# Patient Record
Sex: MALE
[De-identification: names, ages, dates, MRNs, and addresses within clinical notes are randomized per-mention and may not be internally consistent; named-entity substitution may affect disease eponyms.]

---

## 2019-10-30 PROBLEM — Z00.00 ENCOUNTER FOR PREVENTIVE HEALTH EXAMINATION: Status: ACTIVE | Noted: 2019-10-30

## 2019-11-12 ENCOUNTER — APPOINTMENT (OUTPATIENT)
Dept: ORTHOPEDIC SURGERY | Facility: CLINIC | Age: 54
End: 2019-11-12
Payer: COMMERCIAL

## 2019-11-12 VITALS
SYSTOLIC BLOOD PRESSURE: 140 MMHG | OXYGEN SATURATION: 98 % | HEART RATE: 77 BPM | BODY MASS INDEX: 27.59 KG/M2 | DIASTOLIC BLOOD PRESSURE: 90 MMHG | HEIGHT: 74 IN | WEIGHT: 215 LBS

## 2019-11-12 PROCEDURE — 99203 OFFICE O/P NEW LOW 30 MIN: CPT

## 2019-11-13 NOTE — DISCUSSION/SUMMARY
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 He will avoid incline walking on the treadmill and see how his knee responds. We will order an HA injection for him. He will return when the injection arrives. All questions were answered. he will call if any issues arise.

## 2019-11-13 NOTE — HISTORY OF PRESENT ILLNESS
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 He has developed progressive right knee pain, stiffness and difficulty exercising. He developed progressive medial knee pain with running. He has switched to walking on an inclined treadmill with less discomfort. He has had intermittent swelling. he has clicking. He denies any locking or buckling. He has some discomfort on stairs and some limitations in his ADLs. He denies any previous knee issues. He has an MRI>

## 2019-11-13 NOTE — PHYSICAL EXAM
[de-identified] : MRI of the right knee shows moderate medial compartment chondral loss [de-identified] : The patient is a well developed, well nourished male in no apparent distress. He is alert and oriented X 3 with a pleasant mood and appropriate affect. \par \par On physical examination of the right knee, his ROM is 0-125 degrees. The patient walks with a normal gait and stands in neutral alignment. There is trace effusion. No warmth or erythema is noted. The patella is non tender to palpation medially or laterally. There is no crepitus noted. The apprehension and grind tests are negative. The extensor mechanism is intact. There is medial joint line tenderness. The Vidya sign is absent. The Lachman and pivot shift tests are negative. There is no varus or valgus laxity at 0 or 30 degrees. No posterolateral or anteromedial laxity is noted. No masses are palpable. No other soft tissue or bony tenderness is noted. Quadriceps weakness is noted. Neurovascular function is intact.

## 2019-11-13 NOTE — END OF VISIT
[FreeTextEntry3] : All medical record entries made by EUNICE Fine, acting as a scribe for this encounter under the direction of Kyler Newton MD . I have reviewed the chart and agree that the record accurately reflects my personal performance of the history, physical exam, assessment and plan. I have also personally directed, reviewed, and agreed with the chart.

## 2020-01-09 ENCOUNTER — APPOINTMENT (OUTPATIENT)
Dept: ORTHOPEDIC SURGERY | Facility: CLINIC | Age: 55
End: 2020-01-09
Payer: COMMERCIAL

## 2020-01-09 VITALS
BODY MASS INDEX: 27.59 KG/M2 | HEIGHT: 74 IN | DIASTOLIC BLOOD PRESSURE: 90 MMHG | WEIGHT: 215 LBS | SYSTOLIC BLOOD PRESSURE: 130 MMHG

## 2020-01-09 DIAGNOSIS — M17.11 UNILATERAL PRIMARY OSTEOARTHRITIS, RIGHT KNEE: ICD-10-CM

## 2020-01-09 PROCEDURE — 20610 DRAIN/INJ JOINT/BURSA W/O US: CPT | Mod: RT

## 2020-01-09 RX ORDER — HYALURONATE SOD, CROSS-LINKED 30 MG/3 ML
30 SYRINGE (ML) INTRAARTICULAR
Qty: 1 | Refills: 0 | Status: COMPLETED | COMMUNITY
Start: 2019-11-13 | End: 2020-01-09

## 2020-01-22 PROBLEM — M17.11 PRIMARY OSTEOARTHRITIS OF RIGHT KNEE: Status: ACTIVE | Noted: 2019-11-13

## 2020-01-22 NOTE — PROCEDURE
[de-identified] : \par \par Indication:Right knee pain and stiffness\par \par Under strict sterile technique, the right knee was prepped with Betadine. Using the superolateral approach, with the patient supine, a 3mL injection of GelOne was administered intra-articularly. The patient tolerated the procedure well. The patient was instructed to avoid vigorous exercise for 48 hours and will apply ice to the knee for 20 minutes 2-3 times per day if discomfort occurs. Patient will return on an as needed basis. The patient will call if any questions or problems should arise. \par \par GEL-ONE INJECTION - RIGHT KNEE JOINT\par LOT #: [id number]\par EXP: 07-\par MAN: Casie\par NDC: [id number]

## 2024-08-21 ENCOUNTER — APPOINTMENT (OUTPATIENT)
Dept: ORTHOPEDIC SURGERY | Facility: CLINIC | Age: 59
End: 2024-08-21
Payer: COMMERCIAL

## 2024-08-21 VITALS — WEIGHT: 215 LBS | HEIGHT: 74 IN | BODY MASS INDEX: 27.59 KG/M2

## 2024-08-21 DIAGNOSIS — M95.8 OTHER SPECIFIED ACQUIRED DEFORMITIES OF MUSCULOSKELETAL SYSTEM: ICD-10-CM

## 2024-08-21 DIAGNOSIS — M23.40 LOOSE BODY IN KNEE, UNSPECIFIED KNEE: ICD-10-CM

## 2024-08-21 DIAGNOSIS — Z78.9 OTHER SPECIFIED HEALTH STATUS: ICD-10-CM

## 2024-08-21 PROCEDURE — 99204 OFFICE O/P NEW MOD 45 MIN: CPT | Mod: 25

## 2024-08-21 PROCEDURE — 73564 X-RAY EXAM KNEE 4 OR MORE: CPT | Mod: LT

## 2024-08-26 NOTE — DISCUSSION/SUMMARY
[Medication Risks Reviewed] : Medication risks reviewed [Surgical risks reviewed] : Surgical risks reviewed [de-identified] : The patients condition is acute.  Confounding medical conditions/concerns: Treated with outside left knee Celestone injection providing four days of relief.  Tests/Studies Independently Interpreted Today: X-Ray left knee reveals evidence of osteochondral lesion and donor site for loose body. MRI left knee reveals evidence of multiple loose bodies with donor site and osteochondral lesion medial femoral condyle.   We reviewed all treatment options for the patients multiple loose bodies. Due to the progressive worsening of his knee mechanical symptoms, including catching and locking that are now impacting his daily activities, he is a candidate for arthroscopic excision of the loose bodies. We also considered cartilage replacement surgery due to the damage at the donor site, discussing the potential indications and associated risks of ANDRIY (Matrix-Induced Autologous Chondrocyte Implantation) and OATS (Osteochondral Autograft Transfer System) procedures. Given the high risks and potential morbidity of these options, it is advisable to postpone such surgeries as long as feasible.    Discussed non-operative and operative treatment options including left knee excision of loose body and Elana picking microfracture procedure and any indicated procedures. All questions and concerns regarding the surgery were addressed. I can't help permanent chondral damage. Went over the recovery timeline and expected outcomes following surgery. Patient elected to move forward with the surgical procedure.   Interscalene anesthesia, general anesthesia and post operative pain management were discussed. The importance of physical therapy postoperative, the gradual recovery and the rehabilitation program with initial driving restrictions were noted. Discussed appropriate use of knee brace in the case that a meniscal repair is indicated. The patient understands there are no guarantees with surgery. The benefits of decreased pain, increased function, and restoring anatomy were outlined. The risks were reviewed including, but not limited to, infection, failure, bleeding, stiffness, pain, clotting, fracture, retear, hardware failure, deformity, functional limitation, scarring, neurovascular compromise, and narcotic use issues. Under certain circumstances we discussed, further surgery may be indicated. Prescribed patient Meloxicam 15mg daily and discussed risks of side effects, as well as timing/management of medication.  Side effects can include but are not limited to gastrointestinal ulcers and irritation, kidney failure, and bleeding issues. Use as directed and take with food to manage pain, inflammation, and discomfort. We discussed the possible post-op indication of Visco injections vs PRP

## 2024-08-26 NOTE — HISTORY OF PRESENT ILLNESS
[de-identified] : Injury to the left knee while playing pickleball about 3 weeks ago. Patient had cortisone injection and had about 4 days relief and then the pain returned. Patient had a MRI done as well but no report. 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 Just some swelling and loose bodies in the knee.

## 2024-08-26 NOTE — PHYSICAL EXAM
[5___] : quadriceps 5[unfilled]/5 [Negative] : negative anterior draw [Left] : left knee [All Views] : anteroposterior, lateral, skyline, and anteroposterior standing [] : negative Pivot Shift [FreeTextEntry9] : X-Ray left knee reveals evidence of osteochondral lesion and donor site for loose body [de-identified] : Discomfort with hyperextension  [TWNoteComboBox7] : flexion 125 degrees [de-identified] : extension 3 degrees

## 2024-08-26 NOTE — PHYSICAL EXAM
[5___] : quadriceps 5[unfilled]/5 [Negative] : negative anterior draw [Left] : left knee [All Views] : anteroposterior, lateral, skyline, and anteroposterior standing [] : negative Pivot Shift [FreeTextEntry9] : X-Ray left knee reveals evidence of osteochondral lesion and donor site for loose body [de-identified] : Discomfort with hyperextension  [TWNoteComboBox7] : flexion 125 degrees [de-identified] : extension 3 degrees

## 2024-08-26 NOTE — DATA REVIEWED
[MRI] : MRI [Left] : left [Knee] : knee [Report was reviewed and noted in the chart] : The report was reviewed and noted in the chart [I independently reviewed and interpreted images and report] : I independently reviewed and interpreted images and report [I reviewed the films/CD] : I reviewed the films/CD [FreeTextEntry1] : MRI left knee reveals evidence of multiple loose bodies with donor site and osteochondral lesion medial femoral condyle

## 2024-08-26 NOTE — DISCUSSION/SUMMARY
[Medication Risks Reviewed] : Medication risks reviewed [Surgical risks reviewed] : Surgical risks reviewed [de-identified] : The patients condition is acute.  Confounding medical conditions/concerns: Treated with outside left knee Celestone injection providing four days of relief.  Tests/Studies Independently Interpreted Today: X-Ray left knee reveals evidence of osteochondral lesion and donor site for loose body. MRI left knee reveals evidence of multiple loose bodies with donor site and osteochondral lesion medial femoral condyle.   We reviewed all treatment options for the patients multiple loose bodies. Due to the progressive worsening of his knee mechanical symptoms, including catching and locking that are now impacting his daily activities, he is a candidate for arthroscopic excision of the loose bodies. We also considered cartilage replacement surgery due to the damage at the donor site, discussing the potential indications and associated risks of ANDRIY (Matrix-Induced Autologous Chondrocyte Implantation) and OATS (Osteochondral Autograft Transfer System) procedures. Given the high risks and potential morbidity of these options, it is advisable to postpone such surgeries as long as feasible.    Discussed non-operative and operative treatment options including left knee excision of loose body and Elana picking microfracture procedure and any indicated procedures. All questions and concerns regarding the surgery were addressed. I can't help permanent chondral damage. Went over the recovery timeline and expected outcomes following surgery. Patient elected to move forward with the surgical procedure.   Interscalene anesthesia, general anesthesia and post operative pain management were discussed. The importance of physical therapy postoperative, the gradual recovery and the rehabilitation program with initial driving restrictions were noted. Discussed appropriate use of knee brace in the case that a meniscal repair is indicated. The patient understands there are no guarantees with surgery. The benefits of decreased pain, increased function, and restoring anatomy were outlined. The risks were reviewed including, but not limited to, infection, failure, bleeding, stiffness, pain, clotting, fracture, retear, hardware failure, deformity, functional limitation, scarring, neurovascular compromise, and narcotic use issues. Under certain circumstances we discussed, further surgery may be indicated. Prescribed patient Meloxicam 15mg daily and discussed risks of side effects, as well as timing/management of medication.  Side effects can include but are not limited to gastrointestinal ulcers and irritation, kidney failure, and bleeding issues. Use as directed and take with food to manage pain, inflammation, and discomfort. We discussed the possible post-op indication of Visco injections vs PRP

## 2024-08-26 NOTE — HISTORY OF PRESENT ILLNESS
[de-identified] : Injury to the left knee while playing pickleball about 3 weeks ago. Patient had cortisone injection and had about 4 days relief and then the pain returned. Patient had a MRI done as well but no report. 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 Just some swelling and loose bodies in the knee.